# Patient Record
Sex: FEMALE | Race: WHITE | Employment: FULL TIME | URBAN - METROPOLITAN AREA
[De-identification: names, ages, dates, MRNs, and addresses within clinical notes are randomized per-mention and may not be internally consistent; named-entity substitution may affect disease eponyms.]

---

## 2024-08-29 ENCOUNTER — INITIAL CONSULT (OUTPATIENT)
Age: 49
End: 2024-08-29
Payer: COMMERCIAL

## 2024-08-29 VITALS
HEIGHT: 62 IN | BODY MASS INDEX: 26.06 KG/M2 | WEIGHT: 141.6 LBS | DIASTOLIC BLOOD PRESSURE: 78 MMHG | HEART RATE: 74 BPM | SYSTOLIC BLOOD PRESSURE: 124 MMHG

## 2024-08-29 DIAGNOSIS — Z82.49 FAMILY HISTORY OF HEART DISEASE: Primary | ICD-10-CM

## 2024-08-29 DIAGNOSIS — R06.02 SHORTNESS OF BREATH: ICD-10-CM

## 2024-08-29 DIAGNOSIS — Z82.49 FAMILY HISTORY OF HYPERTROPHIC CARDIOMYOPATHY: ICD-10-CM

## 2024-08-29 DIAGNOSIS — Z84.89 FAMILY HISTORY OF SUDDEN DEATH: ICD-10-CM

## 2024-08-29 DIAGNOSIS — R94.31 EKG ABNORMALITIES: ICD-10-CM

## 2024-08-29 PROCEDURE — 99205 OFFICE O/P NEW HI 60 MIN: CPT | Performed by: INTERNAL MEDICINE

## 2024-08-29 PROCEDURE — 93000 ELECTROCARDIOGRAM COMPLETE: CPT | Performed by: INTERNAL MEDICINE

## 2024-08-29 NOTE — PROGRESS NOTES
[address] [facility], [place] [address]  PHONE: [phone]        24    NAME:  Debra Arce  : 1975  MRN: [id number]       SUBJECTIVE:   Debra Arce is a 48 y.o. female seen for a NEW visit regarding the following:     Chief Complaint   Patient presents with    Consultation     Family history of HOCM           HPI:  Here for fam hx of HOCM.    Here for evaluation.   Father with HOCM, Father's side of family with h/o SCD.      She has been healthy overall.  Walking and lifting weights.  4 kids, very active, Spouse is .  She has been doing intermittent fasting, eating from 1pm to 8pm.  Healthy diet.    She feels out of shape, formerly did cross fit.  Some IRWIN.  No CP, pressure. No edema.   Patient denies recent history of orthopnea, PND, excessive dizziness and/or syncope.  4 C sections.     Friend of Chino Mae      Past Medical History, Past Surgical History, Family history, Social History, and Medications were all reviewed with the patient today and updated as necessary.       No current outpatient medications on file.     No current facility-administered medications for this visit.        No Known Allergies  Patient Active Problem List    Diagnosis Date Noted    Family history of hypertrophic cardiomyopathy 2024    Shortness of breath 2024    EKG abnormalities 2024    Family history of sudden death 2024    Papanicolaou smear of cervix with atypical squamous cells of undetermined significance (ASC-US) 2015     7/15/15 - ASCUS; Negative HRHPV -- repeat in one year          Past Surgical History:   Procedure Laterality Date     SECTION       Family History   Problem Relation Age of Onset    Heart Disease Father      Social History     Tobacco Use    Smoking status: Never     Passive exposure: Never    Smokeless tobacco: Never   Substance Use Topics    Alcohol use: Not on file       ROS:    Constitutional:   Negative for fevers

## 2024-09-18 ENCOUNTER — TELEPHONE (OUTPATIENT)
Age: 49
End: 2024-09-18

## 2024-10-21 ENCOUNTER — OFFICE VISIT (OUTPATIENT)
Age: 49
End: 2024-10-21
Payer: COMMERCIAL

## 2024-10-21 VITALS
WEIGHT: 142.8 LBS | HEART RATE: 72 BPM | HEIGHT: 62 IN | BODY MASS INDEX: 26.28 KG/M2 | SYSTOLIC BLOOD PRESSURE: 124 MMHG | DIASTOLIC BLOOD PRESSURE: 84 MMHG

## 2024-10-21 DIAGNOSIS — Z84.89 FAMILY HISTORY OF SUDDEN DEATH: Primary | ICD-10-CM

## 2024-10-21 DIAGNOSIS — R94.31 EKG ABNORMALITIES: ICD-10-CM

## 2024-10-21 DIAGNOSIS — Z82.49 FAMILY HISTORY OF HYPERTROPHIC CARDIOMYOPATHY: ICD-10-CM

## 2024-10-21 PROCEDURE — 99214 OFFICE O/P EST MOD 30 MIN: CPT | Performed by: INTERNAL MEDICINE

## 2024-10-21 NOTE — PROGRESS NOTES
[address] [facility], [address] [place] [address]  PHONE: [phone]     10/21/24    NAME:  Debra Arce  : 1975  MRN: [id number]       SUBJECTIVE:   Debra Arce is a 49 y.o. female seen for a follow up visit regarding the following:     Chief Complaint   Patient presents with    Family history of heart disease    Results     ECHO        HPI: Here for fam hx of HOCM.    Here for evaluation.   Father with HOCM, Father's side of family with h/o SCD.      Echo 2024: Left Ventricle: Normal left ventricular systolic function with a visually estimated EF of 55 - 60%. Left ventricle size is normal. Normal wall thickness. Normal wall motion. Normal diastolic function.      She has been healthy overall.  Walking and lifting weights.  4 kids, very active, Spouse is .  She has been doing intermittent fasting, eating from 1pm to 8pm.  Healthy diet.    NO new angina. No CP, pressure. No edema.   Patient denies recent history of orthopnea, PND, excessive dizziness and/or syncope.  4 C sections.      Friend of Chino Mae       Past Medical History, Past Surgical History, Family history, Social History, and Medications were all reviewed with the patient today and updated as necessary.     Current Outpatient Medications   Medication Sig Dispense Refill    Multiple Vitamin (MULTIVITAMIN ADULT PO) Take 1 tablet by mouth daily       No current facility-administered medications for this visit.        No Known Allergies  Patient Active Problem List    Diagnosis Date Noted    Family history of hypertrophic cardiomyopathy 2024    Shortness of breath 2024    EKG abnormalities 2024    Family history of sudden death 2024    Papanicolaou smear of cervix with atypical squamous cells of undetermined significance (ASC-US) 2015     7/15/15 - ASCUS; Negative HRHPV -- repeat in one year          Past Surgical History:   Procedure Laterality Date     SECTION       Family